# Patient Record
Sex: FEMALE | Race: BLACK OR AFRICAN AMERICAN | NOT HISPANIC OR LATINO | Employment: UNEMPLOYED | URBAN - METROPOLITAN AREA
[De-identification: names, ages, dates, MRNs, and addresses within clinical notes are randomized per-mention and may not be internally consistent; named-entity substitution may affect disease eponyms.]

---

## 2019-01-04 ENCOUNTER — HOSPITAL ENCOUNTER (EMERGENCY)
Facility: OTHER | Age: 19
Discharge: HOME | End: 2019-01-04
Attending: EMERGENCY MEDICINE
Payer: MEDICAID

## 2019-01-04 VITALS
HEIGHT: 62 IN | HEART RATE: 94 BPM | TEMPERATURE: 99 F | WEIGHT: 190 LBS | BODY MASS INDEX: 34.96 KG/M2 | RESPIRATION RATE: 18 BRPM | SYSTOLIC BLOOD PRESSURE: 124 MMHG | DIASTOLIC BLOOD PRESSURE: 72 MMHG | OXYGEN SATURATION: 99 %

## 2019-01-04 DIAGNOSIS — E86.0 DEHYDRATION: ICD-10-CM

## 2019-01-04 DIAGNOSIS — J02.0 ACUTE STREPTOCOCCAL PHARYNGITIS: Primary | ICD-10-CM

## 2019-01-04 LAB
B-HCG UR QL: NEGATIVE
CTP QC/QA: YES
DEPRECATED S PYO AG THROAT QL EIA: NEGATIVE

## 2019-01-04 PROCEDURE — 96372 THER/PROPH/DIAG INJ SC/IM: CPT

## 2019-01-04 PROCEDURE — 81025 URINE PREGNANCY TEST: CPT | Performed by: EMERGENCY MEDICINE

## 2019-01-04 PROCEDURE — 63600175 PHARM REV CODE 636 W HCPCS: Performed by: EMERGENCY MEDICINE

## 2019-01-04 PROCEDURE — 99284 EMERGENCY DEPT VISIT MOD MDM: CPT | Mod: 25

## 2019-01-04 PROCEDURE — 87880 STREP A ASSAY W/OPTIC: CPT

## 2019-01-04 PROCEDURE — 87081 CULTURE SCREEN ONLY: CPT

## 2019-01-04 PROCEDURE — 25000003 PHARM REV CODE 250: Performed by: EMERGENCY MEDICINE

## 2019-01-04 PROCEDURE — 96360 HYDRATION IV INFUSION INIT: CPT

## 2019-01-04 RX ORDER — DEXAMETHASONE SODIUM PHOSPHATE 4 MG/ML
8 INJECTION, SOLUTION INTRA-ARTICULAR; INTRALESIONAL; INTRAMUSCULAR; INTRAVENOUS; SOFT TISSUE
Status: COMPLETED | OUTPATIENT
Start: 2019-01-04 | End: 2019-01-04

## 2019-01-04 RX ORDER — ACETAMINOPHEN 650 MG/20.3ML
650 LIQUID ORAL
Status: COMPLETED | OUTPATIENT
Start: 2019-01-04 | End: 2019-01-04

## 2019-01-04 RX ADMIN — SODIUM CHLORIDE 1000 ML: 0.9 INJECTION, SOLUTION INTRAVENOUS at 12:01

## 2019-01-04 RX ADMIN — PENICILLIN G BENZATHINE 1.2 MILLION UNITS: 1200000 INJECTION, SUSPENSION INTRAMUSCULAR at 12:01

## 2019-01-04 RX ADMIN — DEXAMETHASONE SODIUM PHOSPHATE 8 MG: 4 INJECTION, SOLUTION INTRAMUSCULAR; INTRAVENOUS at 12:01

## 2019-01-04 RX ADMIN — ACETAMINOPHEN 650 MG: 160 SOLUTION ORAL at 12:01

## 2019-01-04 NOTE — ED PROVIDER NOTES
Encounter Date: 1/4/2019    SCRIBE #1 NOTE: ISherri, am scribing for, and in the presence of, Dr. Duff.       History     Chief Complaint   Patient presents with    Sore Throat     Pt c/o sore throat, and SOB for the past week. Denies cough     Time seen by provider: 11:55 AM    This is a 18 y.o. female who presents with complaint of cold symptoms for the past week. Patient reports congestion, runny nose, sore throat, cough, and shortness of breath, but denies fever. Patient denies taking OTC medications. Patient reports use of illicit drugs (marijuana), but denies consumption of alcohol or use of tobacco. Patient denies any known allergies or long term medical problems. Patient reports LMP was 10/27/2018.      The history is provided by the patient.     Review of patient's allergies indicates:  No Known Allergies  No past medical history on file.  No past surgical history on file.  No family history on file.  Social History     Tobacco Use    Smoking status: Not on file   Substance Use Topics    Alcohol use: Not on file    Drug use: Not on file     Review of Systems   Constitutional: Negative for fever.   HENT: Positive for congestion, rhinorrhea and sore throat.    Respiratory: Positive for cough and shortness of breath.    Cardiovascular: Negative for chest pain.   Gastrointestinal: Negative for diarrhea, nausea and vomiting.   Genitourinary: Negative for dysuria.   Musculoskeletal: Negative for back pain.   Skin: Negative for rash.   Neurological: Negative for weakness.   Hematological: Does not bruise/bleed easily.       Physical Exam     Initial Vitals [01/04/19 1135]   BP Pulse Resp Temp SpO2   129/76 (!) 121 18 97.2 °F (36.2 °C) 97 %      MAP       --         Physical Exam    Nursing note and vitals reviewed.  Constitutional: She appears well-developed and well-nourished. She is not diaphoretic. No distress.   HENT:   Head: Normocephalic and atraumatic.   Mouth/Throat: No trismus in the jaw.    Oropharynx erythematous. Tonsillar edema and exudates. No peritonsillar or tonsillar abscess formation. No trismus or drooling. No sheldon's angina.    Eyes: EOM are normal. Pupils are equal, round, and reactive to light.   Neck: Normal range of motion. Neck supple. No stridor present.   Cervical lymphadenopathy.    Cardiovascular: Regular rhythm and normal heart sounds. Tachycardia present.  Exam reveals no gallop and no friction rub.    No murmur heard.  Pulmonary/Chest: Breath sounds normal. No stridor. No respiratory distress. She has no wheezes. She has no rhonchi. She has no rales.   Abdominal: Soft. Bowel sounds are normal. She exhibits no distension. There is no tenderness. There is no rebound and no guarding.   Musculoskeletal: Normal range of motion.   Lymphadenopathy:     She has cervical adenopathy.   Neurological: She is alert and oriented to person, place, and time.   Skin: Skin is warm and dry.   Psychiatric: She has a normal mood and affect. Her behavior is normal. Judgment and thought content normal.         ED Course   Procedures  Labs Reviewed   THROAT SCREEN, RAPID   CULTURE, STREP A,  THROAT   POCT URINE PREGNANCY          Imaging Results    None          Medical Decision Making:   Clinical Tests:   Lab Tests: Ordered and Reviewed            Scribe Attestation:   Scribe #1: I performed the above scribed service and the documentation accurately describes the services I performed. I attest to the accuracy of the note.    Attending Attestation:           Physician Attestation for Scribe:  Physician Attestation Statement for Scribe #1: I, Dr. Duff, reviewed documentation, as scribed by Sherri Guaman in my presence, and it is both accurate and complete.         Attending ED Notes:   Emergent evaluation 18-year-old male with sore throat.  Patient is afebrile, nontoxic-appearing stable vital signs.  No trismus, stridor or drooling.  No Sheldon angina.  Patient tolerating p.o. intake without  complication.  Despite negative strep test clinically patient has strep pharyngitis.  The patient is extensively counseled on his diagnosis and treatment, discharged good condition and directed follow-up with his PCP in the next 24-48 hours.             Clinical Impression:     1. Acute streptococcal pharyngitis    2. Dehydration                                   Roque Rasheed MD  01/07/19 0518

## 2019-01-04 NOTE — ED NOTES
Pt presents with c/o sore throat, nasal congestion, subjective fevers and body aches x1 week. Pt denies any releif from OTC ibuprofen. Pt is AAOx4. RR are even and unlabored. Skin is warm and dry.

## 2019-01-06 LAB — BACTERIA THROAT CULT: NORMAL

## 2019-03-28 ENCOUNTER — HOSPITAL ENCOUNTER (EMERGENCY)
Facility: OTHER | Age: 19
Discharge: HOME | End: 2019-03-29
Attending: EMERGENCY MEDICINE
Payer: MEDICAID

## 2019-03-28 VITALS
OXYGEN SATURATION: 99 % | SYSTOLIC BLOOD PRESSURE: 124 MMHG | TEMPERATURE: 98 F | WEIGHT: 199.06 LBS | DIASTOLIC BLOOD PRESSURE: 83 MMHG | BODY MASS INDEX: 35.27 KG/M2 | RESPIRATION RATE: 20 BRPM | HEART RATE: 110 BPM | HEIGHT: 63 IN

## 2019-03-28 DIAGNOSIS — N92.1 METRORRHAGIA: Primary | ICD-10-CM

## 2019-03-28 LAB
B-HCG UR QL: NEGATIVE
CTP QC/QA: YES

## 2019-03-28 PROCEDURE — 25000003 PHARM REV CODE 250: Performed by: PHYSICIAN ASSISTANT

## 2019-03-28 PROCEDURE — 99284 EMERGENCY DEPT VISIT MOD MDM: CPT | Mod: 25

## 2019-03-28 PROCEDURE — 81025 URINE PREGNANCY TEST: CPT | Performed by: EMERGENCY MEDICINE

## 2019-03-28 RX ORDER — IBUPROFEN 600 MG/1
600 TABLET ORAL
Status: COMPLETED | OUTPATIENT
Start: 2019-03-28 | End: 2019-03-28

## 2019-03-28 RX ORDER — IBUPROFEN 600 MG/1
600 TABLET ORAL EVERY 6 HOURS PRN
Qty: 15 TABLET | Refills: 0 | Status: SHIPPED | OUTPATIENT
Start: 2019-03-28

## 2019-03-28 RX ADMIN — IBUPROFEN 600 MG: 600 TABLET ORAL at 11:03

## 2019-03-29 NOTE — ED TRIAGE NOTES
Pt reports vaginal bleeding x 1 day w/ lower abdominal pain. Pt is AAOx 3, answers questions appropriately. Using cellphone throughout intake process, does not appear in any distress

## 2019-03-29 NOTE — ED PROVIDER NOTES
"Encounter Date: 3/28/2019       History     Chief Complaint   Patient presents with    Vaginal Bleeding     reports heavy vaginal bleeding with clots x1 day pta, reports unknown if pregnant or not stating " I dont believe in taking pregnancy test at home, I always just come to the ER to get tested". LMP 11/26/2018.      Patient is a 19-year-old female with no pertinent past medical history presents to the ED with vaginal bleeding.  Patient reports passing blood clots approximately 2 hr ago.  She reports right-sided pelvic pain. Patient states her last menstrual cycle was November 26.  She states she did not take a home UPT because "those tests are expensive ".  She did not take any medicine for her pain. She reports nausea and 1 episode of emesis prior to arrival.  She denies history of irregular cycles.    The history is provided by the patient.     Review of patient's allergies indicates:  No Known Allergies  No past medical history on file.  No past surgical history on file.  No family history on file.  Social History     Tobacco Use    Smoking status: Not on file   Substance Use Topics    Alcohol use: Not on file    Drug use: Not on file     Review of Systems   Constitutional: Negative for chills and fever.   HENT: Negative for congestion and sore throat.    Eyes: Negative for pain.   Respiratory: Negative for shortness of breath.    Cardiovascular: Negative for chest pain.   Gastrointestinal: Negative for abdominal pain, diarrhea, nausea and vomiting.   Genitourinary: Positive for menstrual problem (ammenorrhea) and vaginal bleeding. Negative for dysuria.   Musculoskeletal: Negative for arthralgias.   Skin: Negative for rash.   Neurological: Negative for headaches.       Physical Exam     Initial Vitals [03/28/19 2254]   BP Pulse Resp Temp SpO2   124/83 110 20 98 °F (36.7 °C) 99 %      MAP       --         Physical Exam    Constitutional: Vital signs are normal. She is cooperative.   HENT:   Head: " Normocephalic and atraumatic.   Eyes: EOM are normal. Pupils are equal, round, and reactive to light.   Neck: Normal range of motion. Neck supple.   Cardiovascular: Normal rate, regular rhythm and intact distal pulses.   Pulmonary/Chest: Breath sounds normal. She has no wheezes. She has no rhonchi. She has no rales.   Abdominal: Soft. Bowel sounds are normal. There is tenderness (Mild right sided pelvic).   Genitourinary:   Genitourinary Comments: Attempted to perform pelvic exam, patient was noncompliant and refused.   Musculoskeletal: Normal range of motion. She exhibits no edema.   Neurological: She is alert and oriented to person, place, and time. GCS eye subscore is 4. GCS verbal subscore is 5. GCS motor subscore is 6.   Skin: Skin is warm and dry. No rash noted.         ED Course   Procedures  Labs Reviewed   URINALYSIS, REFLEX TO URINE CULTURE   POCT URINE PREGNANCY          Imaging Results    None          Medical Decision Making:   Initial Assessment:   Urgent evaluation of a 19 y.o. female presenting to the emergency department complaining of vaginal bleeding for 2 hr.  Patient concerned with passing clots.  Patient is afebrile, nontoxic appearing and hemodynamically stable.  Triage vital signs report mild tachycardia.  Patient is not tachycardic on exam.  UPT is negative. Patient is accompanited by her boyfriend.  I do not suspect ovarian torsion.  Patient is likely cramping from starting her menstrual cycle.  Pelvic exam was attempted but patient refused.  She will be given Motrin here in the ED and reasonable prescription.  She is given strict return precautions.  She is given information to follow up with [facility].                      Clinical Impression:     1. Metrorrhagia                               Joey Casillas PA-C  03/29/19 0003

## 2020-08-10 ENCOUNTER — HOSPITAL ENCOUNTER (EMERGENCY)
Facility: OTHER | Age: 20
Discharge: HOME | End: 2020-08-10
Attending: EMERGENCY MEDICINE

## 2020-08-10 VITALS
TEMPERATURE: 99 F | OXYGEN SATURATION: 99 % | DIASTOLIC BLOOD PRESSURE: 76 MMHG | BODY MASS INDEX: 38.23 KG/M2 | RESPIRATION RATE: 18 BRPM | HEART RATE: 98 BPM | WEIGHT: 215.81 LBS | SYSTOLIC BLOOD PRESSURE: 134 MMHG

## 2020-08-10 DIAGNOSIS — N76.0 ACUTE VAGINITIS: Primary | ICD-10-CM

## 2020-08-10 LAB
B-HCG UR QL: NEGATIVE
BACTERIA GENITAL QL WET PREP: ABNORMAL
CLUE CELLS VAG QL WET PREP: ABNORMAL
CTP QC/QA: YES
FILAMENT FUNGI VAG WET PREP-#/AREA: ABNORMAL
POCT GLUCOSE: 72 MG/DL (ref 70–110)
SPECIMEN SOURCE: ABNORMAL
T VAGINALIS GENITAL QL WET PREP: ABNORMAL
WBC #/AREA VAG WET PREP: ABNORMAL
YEAST GENITAL QL WET PREP: ABNORMAL

## 2020-08-10 PROCEDURE — 25000003 PHARM REV CODE 250: Performed by: EMERGENCY MEDICINE

## 2020-08-10 PROCEDURE — 99283 EMERGENCY DEPT VISIT LOW MDM: CPT | Mod: 25

## 2020-08-10 PROCEDURE — 63600175 PHARM REV CODE 636 W HCPCS: Performed by: EMERGENCY MEDICINE

## 2020-08-10 PROCEDURE — 63700000 PHARM REV CODE 250 ALT 637 W/O HCPCS: Performed by: EMERGENCY MEDICINE

## 2020-08-10 PROCEDURE — 81025 URINE PREGNANCY TEST: CPT | Performed by: EMERGENCY MEDICINE

## 2020-08-10 PROCEDURE — 82962 GLUCOSE BLOOD TEST: CPT

## 2020-08-10 PROCEDURE — 87210 SMEAR WET MOUNT SALINE/INK: CPT

## 2020-08-10 PROCEDURE — 87491 CHLMYD TRACH DNA AMP PROBE: CPT

## 2020-08-10 RX ORDER — METRONIDAZOLE 7.5 MG/G
GEL TOPICAL 2 TIMES DAILY
Qty: 45 G | Refills: 0 | OUTPATIENT
Start: 2020-08-10 | End: 2023-11-29

## 2020-08-10 RX ORDER — CEFTRIAXONE 250 MG/1
250 INJECTION, POWDER, FOR SOLUTION INTRAMUSCULAR; INTRAVENOUS
Status: DISCONTINUED | OUTPATIENT
Start: 2020-08-10 | End: 2020-08-10 | Stop reason: HOSPADM

## 2020-08-10 RX ORDER — AZITHROMYCIN 250 MG/1
1000 TABLET, FILM COATED ORAL
Status: COMPLETED | OUTPATIENT
Start: 2020-08-10 | End: 2020-08-10

## 2020-08-10 RX ORDER — ONDANSETRON 4 MG/1
8 TABLET, FILM COATED ORAL
Status: COMPLETED | OUTPATIENT
Start: 2020-08-10 | End: 2020-08-10

## 2020-08-10 RX ADMIN — AZITHROMYCIN MONOHYDRATE 1000 MG: 250 TABLET ORAL at 02:08

## 2020-08-10 RX ADMIN — ONDANSETRON HYDROCHLORIDE 8 MG: 4 TABLET, FILM COATED ORAL at 02:08

## 2020-08-10 NOTE — ED PROVIDER NOTES
Encounter Date: 8/10/2020    SCRIBE #1 NOTE: IEmi, am scribing for, and in the presence of, Dr. Lees.       History     Chief Complaint   Patient presents with    Possible Pregnancy     pt requesting pregnancy test, reports wanting wellness check     Time seen by provider: 1:45 AM    This is a 20 y.o. female who presents with complaint of white vaginal discharge that began one week ago. There is associated nausea, vomiting, abdominal pain and malodorous urine. Patient also reports urinary frequency. She states she has not had her menstrual cycle for the last several months. States her menstrual cycle is usually regular. She reports having unprotected sex, and thinks she may be pregnant. She denies fever, chill, cough, dysuria, diarrhea, or constipation. She endorses SOB with laying flat. Pt reports history of HTN but denies use of any antihypertensives. Denies history of DM.    The history is provided by the patient.     Review of patient's allergies indicates:  No Known Allergies  No past medical history on file.  No past surgical history on file.  No family history on file.  Social History     Tobacco Use    Smoking status: Never Smoker   Substance Use Topics    Alcohol use: Not Currently    Drug use: Not on file     Review of Systems   Constitutional: Negative for chills and fever.   HENT: Negative for congestion, sore throat and trouble swallowing.    Eyes: Negative for photophobia and visual disturbance.   Respiratory: Negative for cough and shortness of breath.    Cardiovascular: Negative for chest pain.   Gastrointestinal: Positive for abdominal pain, nausea and vomiting. Negative for constipation and diarrhea.   Genitourinary: Positive for frequency and vaginal discharge. Negative for dysuria and hematuria.        Positive for malodorous urine.   Musculoskeletal: Negative for back pain and neck pain.   Skin: Negative for rash and wound.   Neurological: Negative for weakness and headaches.    Psychiatric/Behavioral: Negative.        Physical Exam     Initial Vitals [08/10/20 0111]   BP Pulse Resp Temp SpO2   136/77 110 18 99 °F (37.2 °C) 98 %      MAP       --         Physical Exam    Nursing note and vitals reviewed.  Constitutional: She appears well-developed and well-nourished. No distress.   HENT:   Head: Normocephalic and atraumatic.   Mouth/Throat: Oropharynx is clear and moist.   Eyes: Conjunctivae and EOM are normal. Pupils are equal, round, and reactive to light.   Neck: Normal range of motion. Neck supple.   Cardiovascular: Normal rate, regular rhythm and normal heart sounds. Exam reveals no gallop and no friction rub.    No murmur heard.  Pulmonary/Chest: Breath sounds normal. No respiratory distress. She has no wheezes. She has no rhonchi. She has no rales.   Abdominal: Soft. There is no abdominal tenderness. There is no rebound and no guarding.   Genitourinary:    Genitourinary Comments: Mucus like discharge, malodorous. No CMT. Normal adnexal tenderness. No masses. No tenderness on bimanual exam. No rashes.     Musculoskeletal: Normal range of motion. No tenderness or edema.   Neurological: She is alert and oriented to person, place, and time. She has normal strength.   Skin: Skin is warm and dry. No rash noted.   Psychiatric: She has a normal mood and affect. Her behavior is normal. Judgment and thought content normal.         ED Course   Procedures  Labs Reviewed   VAGINAL SCREEN - Abnormal; Notable for the following components:       Result Value    WBC - Vaginal Screen Few (*)     Bacteria - Vaginal Screen Occasional (*)     All other components within normal limits   C. TRACHOMATIS/N. GONORRHOEAE BY AMP DNA   POCT URINE PREGNANCY   POCT GLUCOSE          Imaging Results    None          Medical Decision Making:   History:   Old Medical Records: I decided to obtain old medical records.  Initial Assessment:   20 year old female presents due to irregular menses with vaginal discharge  following unprotected sex as well as nausea. Plan for pelvic exam, GC and chlamydia wet prep, pregnancy test, and Zofran.    Differential Diagnosis:   Vaginal infection such as yeast infection, vaginitis, topical irritant, bacterial vaginosis, STD such as gonorrhea, chlamydia, UTI, discomfort of pregnancy, ectopic pregnancy, ovarian cysts, ovarian torsion, malignancy, constipation, colitis, diverticulitis    Clinical Tests:   Lab Tests: Ordered and Reviewed  ED Management:  Patient improved with treatment in the emergency department and comfortable going home. Discussed reasons to return and importance of followup.  Patient understands that the emergency visit today is primarily to address immediate concerns and to rule out emergent cause of symptoms and that they may require further workup and evaluation as an outpatient. All questions addressed and patient given discharge instructions and followup information.               Scribe Attestation:   Scribe #1: I performed the above scribed service and the documentation accurately describes the services I performed. I attest to the accuracy of the note.    Attending Attestation:           Physician Attestation for Scribe:  Physician Attestation Statement for Scribe #1: I, Dr. Lees, reviewed documentation, as scribed by Emi Sands in my presence, and it is both accurate and complete.                               Clinical Impression:     1. Acute vaginitis                ED Disposition Condition    Discharge Stable        ED Prescriptions     Medication Sig Dispense Start Date End Date Auth. Provider    metroNIDAZOLE (METROGEL) 0.75 % gel Apply topically 2 (two) times daily. 45 g 8/10/2020 8/10/2021 Denise Lees MD        Follow-up Information     Follow up With Specialties Details Why Contact Info    [facility]  Schedule an appointment as soon as possible for a visit   [address]  [place] [address]  [phone]      [facility]  [facility]    [address] [place] [id number]  [phone]      [facility] Obstetrics and Gynecology   [facility] [place] [address] [phone]                                     Denise Lees MD  08/10/20 4380

## 2020-08-12 LAB
C TRACH DNA SPEC QL NAA+PROBE: DETECTED
N GONORRHOEA DNA SPEC QL NAA+PROBE: NOT DETECTED

## 2023-11-28 ENCOUNTER — HOSPITAL ENCOUNTER (EMERGENCY)
Facility: OTHER | Age: 23
Discharge: HOME | End: 2023-11-29
Attending: INTERNAL MEDICINE
Payer: MEDICAID

## 2023-11-28 DIAGNOSIS — R31.9 URINARY TRACT INFECTION WITH HEMATURIA, SITE UNSPECIFIED: ICD-10-CM

## 2023-11-28 DIAGNOSIS — N39.0 URINARY TRACT INFECTION WITH HEMATURIA, SITE UNSPECIFIED: ICD-10-CM

## 2023-11-28 DIAGNOSIS — B96.89 BV (BACTERIAL VAGINOSIS): ICD-10-CM

## 2023-11-28 DIAGNOSIS — J06.9 VIRAL URI WITH COUGH: Primary | ICD-10-CM

## 2023-11-28 DIAGNOSIS — R05.9 COUGH: ICD-10-CM

## 2023-11-28 DIAGNOSIS — N76.0 BV (BACTERIAL VAGINOSIS): ICD-10-CM

## 2023-11-28 LAB
B-HCG UR QL: NEGATIVE
BACTERIA #/AREA URNS HPF: ABNORMAL /HPF
BACTERIA GENITAL QL WET PREP: ABNORMAL
BILIRUB UR QL STRIP: NEGATIVE
CLARITY UR: ABNORMAL
CLUE CELLS VAG QL WET PREP: ABNORMAL
COLOR UR: YELLOW
CTP QC/QA: YES
FILAMENT FUNGI VAG WET PREP-#/AREA: ABNORMAL
GLUCOSE UR QL STRIP: NEGATIVE
GROUP A STREP, MOLECULAR: NEGATIVE
HCV AB SERPL QL IA: NEGATIVE
HGB UR QL STRIP: NEGATIVE
HIV 1+2 AB+HIV1 P24 AG SERPL QL IA: NEGATIVE
KETONES UR QL STRIP: NEGATIVE
LEUKOCYTE ESTERASE UR QL STRIP: ABNORMAL
MICROSCOPIC COMMENT: ABNORMAL
NITRITE UR QL STRIP: NEGATIVE
PH UR STRIP: 6 [PH] (ref 5–8)
POC MOLECULAR INFLUENZA A AGN: NEGATIVE
POC MOLECULAR INFLUENZA A AGN: NEGATIVE
POC MOLECULAR INFLUENZA B AGN: NEGATIVE
POC MOLECULAR INFLUENZA B AGN: NEGATIVE
PROT UR QL STRIP: NEGATIVE
RBC #/AREA URNS HPF: 0 /HPF (ref 0–4)
SARS-COV-2 RDRP RESP QL NAA+PROBE: NEGATIVE
SARS-COV-2 RDRP RESP QL NAA+PROBE: NEGATIVE
SP GR UR STRIP: 1.01 (ref 1–1.03)
SPECIMEN SOURCE: ABNORMAL
SQUAMOUS #/AREA URNS HPF: 7 /HPF
T VAGINALIS GENITAL QL WET PREP: ABNORMAL
URN SPEC COLLECT METH UR: ABNORMAL
UROBILINOGEN UR STRIP-ACNC: 1 EU/DL
WBC #/AREA URNS HPF: >100 /HPF (ref 0–5)
WBC #/AREA VAG WET PREP: ABNORMAL
WBC CLUMPS URNS QL MICRO: ABNORMAL
YEAST GENITAL QL WET PREP: ABNORMAL

## 2023-11-28 PROCEDURE — 87086 URINE CULTURE/COLONY COUNT: CPT | Performed by: INTERNAL MEDICINE

## 2023-11-28 PROCEDURE — 87635 SARS-COV-2 COVID-19 AMP PRB: CPT | Performed by: INTERNAL MEDICINE

## 2023-11-28 PROCEDURE — 87651 STREP A DNA AMP PROBE: CPT | Performed by: PHYSICIAN ASSISTANT

## 2023-11-28 PROCEDURE — 87210 SMEAR WET MOUNT SALINE/INK: CPT | Performed by: INTERNAL MEDICINE

## 2023-11-28 PROCEDURE — 86803 HEPATITIS C AB TEST: CPT | Performed by: EMERGENCY MEDICINE

## 2023-11-28 PROCEDURE — 81000 URINALYSIS NONAUTO W/SCOPE: CPT | Performed by: INTERNAL MEDICINE

## 2023-11-28 PROCEDURE — 81025 URINE PREGNANCY TEST: CPT | Performed by: PHYSICIAN ASSISTANT

## 2023-11-28 PROCEDURE — 87077 CULTURE AEROBIC IDENTIFY: CPT | Performed by: INTERNAL MEDICINE

## 2023-11-28 PROCEDURE — 25000003 PHARM REV CODE 250: Performed by: INTERNAL MEDICINE

## 2023-11-28 PROCEDURE — 87389 HIV-1 AG W/HIV-1&-2 AB AG IA: CPT | Performed by: EMERGENCY MEDICINE

## 2023-11-28 PROCEDURE — 87186 SC STD MICRODIL/AGAR DIL: CPT | Performed by: INTERNAL MEDICINE

## 2023-11-28 PROCEDURE — 87088 URINE BACTERIA CULTURE: CPT | Performed by: INTERNAL MEDICINE

## 2023-11-28 PROCEDURE — 87491 CHLMYD TRACH DNA AMP PROBE: CPT | Performed by: INTERNAL MEDICINE

## 2023-11-28 PROCEDURE — 99284 EMERGENCY DEPT VISIT MOD MDM: CPT | Mod: 25

## 2023-11-28 RX ORDER — IBUPROFEN 600 MG/1
600 TABLET ORAL
Status: COMPLETED | OUTPATIENT
Start: 2023-11-28 | End: 2023-11-28

## 2023-11-28 RX ORDER — IBUPROFEN 600 MG/1
TABLET ORAL
Status: DISCONTINUED
Start: 2023-11-28 | End: 2023-11-29 | Stop reason: HOSPADM

## 2023-11-28 RX ADMIN — IBUPROFEN 600 MG: 600 TABLET, FILM COATED ORAL at 10:11

## 2023-11-29 VITALS
BODY MASS INDEX: 32.44 KG/M2 | RESPIRATION RATE: 18 BRPM | DIASTOLIC BLOOD PRESSURE: 75 MMHG | OXYGEN SATURATION: 98 % | WEIGHT: 190 LBS | HEIGHT: 64 IN | SYSTOLIC BLOOD PRESSURE: 128 MMHG | HEART RATE: 75 BPM | TEMPERATURE: 98 F

## 2023-11-29 PROCEDURE — 25000003 PHARM REV CODE 250: Performed by: INTERNAL MEDICINE

## 2023-11-29 RX ORDER — CEPHALEXIN 500 MG/1
500 CAPSULE ORAL EVERY 12 HOURS
Qty: 10 CAPSULE | Refills: 0 | Status: SHIPPED | OUTPATIENT
Start: 2023-11-29 | End: 2023-12-04

## 2023-11-29 RX ORDER — METRONIDAZOLE 500 MG/1
500 TABLET ORAL 2 TIMES DAILY
Qty: 14 TABLET | Refills: 0 | Status: SHIPPED | OUTPATIENT
Start: 2023-11-29 | End: 2023-12-06

## 2023-11-29 RX ORDER — CEPHALEXIN 500 MG/1
500 CAPSULE ORAL
Status: COMPLETED | OUTPATIENT
Start: 2023-11-29 | End: 2023-11-29

## 2023-11-29 RX ORDER — METRONIDAZOLE 500 MG/1
500 TABLET ORAL
Status: COMPLETED | OUTPATIENT
Start: 2023-11-29 | End: 2023-11-29

## 2023-11-29 RX ADMIN — CEPHALEXIN 500 MG: 500 CAPSULE ORAL at 12:11

## 2023-11-29 RX ADMIN — METRONIDAZOLE 500 MG: 500 TABLET ORAL at 12:11

## 2023-11-29 NOTE — FIRST PROVIDER EVALUATION
Emergency Department TeleTriage Encounter Note      CHIEF COMPLAINT    Chief Complaint   Patient presents with    Cough     X 2 days.. hurts ribs when coughing     Sore Throat     X 2 days       VITAL SIGNS   Initial Vitals [11/28/23 2054]   BP Pulse Resp Temp SpO2   139/87 76 16 98.5 °F (36.9 °C) 100 %      MAP       --            ALLERGIES    Review of patient's allergies indicates:  No Known Allergies    PROVIDER TRIAGE NOTE  This is a teletriage evaluation of a 23 y.o. female presenting to the ED complaining of sore throat for two days.     Initial orders will be placed and care will be transferred to an alternate provider when patient is roomed for a full evaluation. Any additional orders and the final disposition will be determined by that provider.       ORDERS  Labs Reviewed   GROUP A STREP, MOLECULAR   HIV 1 / 2 ANTIBODY   HEPATITIS C ANTIBODY   POCT URINE PREGNANCY       ED Orders (720h ago, onward)      Start Ordered     Status Ordering Provider    11/28/23 2104 11/28/23 2103  POCT urine pregnancy  Once         Ordered RK GROVES    11/28/23 2103 11/28/23 2102  Group A Strep, Molecular  STAT         Ordered RK GROVES    11/28/23 2057 11/28/23 2056  HIV 1/2 Ag/Ab (4th Gen)  STAT         Ordered MARIANN DELGADO    11/28/23 2057 11/28/23 2056  Hepatitis C Antibody  STAT         Ordered MARIANN DELGADO              Virtual Visit Note: The provider triage portion of this emergency department evaluation and documentation was performed via [employer], a HIPAA-compliant telemedicine application, in concert with a tele-presenter in the room. A face to face patient evaluation with one of my colleagues will occur once the patient is placed in an emergency department room.      DISCLAIMER: This note was prepared with [employer] voice recognition transcription software. Garbled syntax, mangled pronouns, and other bizarre constructions may be attributed to that software system.

## 2023-11-29 NOTE — DISCHARGE INSTRUCTIONS
Diagnosis:   1. Viral URI with cough    2. Cough        Tests you had showed:   Labs Reviewed   GROUP A STREP, MOLECULAR   HIV 1 / 2 ANTIBODY   HEPATITIS C ANTIBODY   POCT URINE PREGNANCY   SARS-COV-2 RDRP GENE   POCT INFLUENZA A/B MOLECULAR      X-Ray Chest PA And Lateral    (Results Pending)       Treatments you received were:   Medications   ibuprofen tablet 600 mg (has no administration in time range)       Home Care Instructions:  - Medications: Continue taking your home medications as prescribed    Follow-Up Plan:  - Follow-up with: Primary care doctor or provider of your choice  within 1-2  days  - Additional testing and/or evaluation will be directed by your primary doctor    Return to the Emergency Department for symptoms including but not limited to: worsening symptoms, severe back pain, shortness of breath or chest pain, vomiting with inability to hold down fluids, blood in vomit or poop, fevers greater than 100.4°F, passing out/fainting/unconsciousness, or other concerning symptoms.     Future Appointments   Date Time Provider Department Center   11/28/2023  9:45 PM [facility] XRAY ED [facility] X[facility]     You were diagnosed with a sore throat. You can use warm salt-water gargle for supportive care. You can also try over the coutner Chloraseptic that you can find in a grocery store or pharmacy. Please follow the instructions or ask a pharmacist for clarifying instructions if you have questions based on the dosing. We recommend that you drink plenty of fluids and try to stay as hydrated as possible. Warm teas, soup and popsicles can help soothe the throat. Please come back to the ED if you began to have feelings of throat closure, difficulty breathing, changes in the patient's voice, difficulty tolerating spit.       You can use motrin and tylenol for symptoms. You can also try Flonase over the counter for congestion, cough drops, and Mucinex as well. Please consult with a pharmacist if there you have  any questions.

## 2023-11-29 NOTE — ED PROVIDER NOTES
Encounter date: 9:19 PM 11/29/2023  SCRIBE #1 NOTE: I, Wali Alfaro, am scribing for, and in the presence of,  Vahe Boyer MD. I have scribed the following portions of the note - Other sections scribed: HPI, ROS, PE.        Source of History   Patient     Chief Complaint   Pt presents with:   Cough (X 2 days.. hurts ribs when coughing ) and Sore Throat (X 2 days)      History Of Present Illness   Lorenza Grajeda is a 23 y.o. female with no PMHx on file who presents to the ED with complaint of cough and sore throat for the last 2 days. She reports associated left sided rib pain that she feels with coughing bouts which began yesterday, nasal congestion, a cough with yellow sputum. She does note shortness of breath but states that she only has this in the morning when she was congested.  After her congestion clear she notes that her breathing is normal and she does not have shortness of breath or difficulty breathing when she is at rest or moving around throughout the rest of the day. She states that she is had throat pain with swallowing but has been tolerating food and liquid, and able to tolerate her own secretions. She states that for this reason, she has not yet taken any medication for her symptoms. She denies any fever or chills. She additionally denies taking any daily medications, birth control, or hormones, and denies having any medical allergies, history of cancer or blood clots in her heart or lungs, recent surgeries, or immobility in the last 4 days.  She denies chest pain, lower leg swelling, family history of blood clots, previous blood clots.  Denies dysuria, hematuria.    Denies: fever, chills  This is the extent to the patients complaints today here in the emergency department.  Past History   Review of patient's allergies indicates:  No Known Allergies    No current facility-administered medications on file prior to encounter.     Current Outpatient Medications on File Prior to Encounter  "  Medication Sig Dispense Refill    ibuprofen (ADVIL,MOTRIN) 600 MG tablet Take 1 tablet (600 mg total) by mouth every 6 (six) hours as needed for Pain. 15 tablet 0    [DISCONTINUED] metroNIDAZOLE (METROGEL) 0.75 % gel Apply topically 2 (two) times daily. 45 g 0       As per HPI and below:  No past medical history on file.  No past surgical history on file.    Social History     Socioeconomic History    Marital status: Single   Tobacco Use    Smoking status: Never   Substance and Sexual Activity    Alcohol use: Not Currently       No family history on file.    Physical Exam     Vitals:    11/28/23 2054 11/28/23 2218 11/29/23 0016   BP: 139/87  128/75   BP Location: Left arm  Left arm   Patient Position: Sitting  Sitting   Pulse: 76  75   Resp: 16 19 18   Temp: 98.5 °F (36.9 °C)  98.1 °F (36.7 °C)   TempSrc: Oral  Oral   SpO2: 100% 98%    Weight: 86.2 kg (190 lb)     Height: 5' 4" (1.626 m)       Physical Exam:   Nursing note and vitals reviewed.  Appearance: well-appearing, nontoxic female in no acute respiratory distress.  Making purposeful movements.  Speaking in full sentences.  Skin: No obvious rashes seen.  Good turgor.  No abrasions.  No ecchymoses.  Eyes: No conjunctival injection. EOMI, PERRL.  Head: NC/AT  ENT: Mild erythema and 1+ tonsillar hypertrophy. No exudate. Tolerating secretions. No trismus.  Chest: CTAB. No increased work of breathing, bilateral chest rise.  Cardiovascular: Regular rate and rhythm.  Normal equal bilateral radial pulses.  Abdomen: Soft.  Not distended.  Nontender.  No guarding.  No rebound. No Masses  Musculoskeletal: No obvious deformities.   Neck supple, full range of motion, no obvious deformity.   No tenderness to palpation of cervical through lumbar spine.  No step-offs or deformities. Good range of motion all joints.  Neurologic: Moves all extremities.  Normal sensation.  No facial droop.  Normal speech.    Mental Status:  Alert and oriented x 3.  Appropriate, " conversant.      Results and Medications    Procedures    Labs Reviewed   VAGINAL SCREEN - Abnormal; Notable for the following components:       Result Value    Clue Cells Few (*)     WBC - Vaginal Screen Occasional (*)     Bacteria - Vaginal Screen Few (*)     All other components within normal limits    Narrative:     Release to patient->Immediate   URINALYSIS, REFLEX TO URINE CULTURE - Abnormal; Notable for the following components:    Appearance, UA Hazy (*)     Leukocytes, UA 2+ (*)     All other components within normal limits    Narrative:     Specimen Source->Urine   URINALYSIS MICROSCOPIC - Abnormal; Notable for the following components:    WBC, UA >100 (*)     WBC Clumps, UA Few (*)     Bacteria Many (*)     All other components within normal limits    Narrative:     Specimen Source->Urine   GROUP A STREP, MOLECULAR   C. TRACHOMATIS/N. GONORRHOEAE BY AMP DNA   CULTURE, URINE   HIV 1 / 2 ANTIBODY    Narrative:     Release to patient->Immediate   HEPATITIS C ANTIBODY    Narrative:     Release to patient->Immediate   POCT URINE PREGNANCY   SARS-COV-2 RDRP GENE   POCT INFLUENZA A/B MOLECULAR       Imaging Results              X-Ray Chest PA And Lateral (Final result)  Result time 11/28/23 21:53:53      Final result by Lena Sarah MD (11/28/23 21:53:53)                   Impression:      No acute cardiopulmonary process identified.      Electronically signed by: Lena Sarah MD  Date:    11/28/2023  Time:    21:53               Narrative:    EXAMINATION:  XR CHEST PA AND LATERAL    CLINICAL HISTORY:  Cough, unspecified    TECHNIQUE:  PA and lateral views of the chest were performed.    COMPARISON:  None    FINDINGS:  Cardiac silhouette is normal in size.  Lungs are symmetrically expanded.  No evidence of focal consolidative process, pneumothorax, or significant pleural effusion.  No acute osseous abnormality identified.                                          Medications   ibuprofen (ADVIL,MOTRIN) 600  MG tablet (has no administration in time range)   ibuprofen tablet 600 mg (600 mg Oral Given 11/28/23 2207)   metroNIDAZOLE tablet 500 mg (500 mg Oral Given 11/29/23 0011)   cephALEXin capsule 500 mg (500 mg Oral Given 11/29/23 0011)       MDM, Impression and Plan   Independently Interpreted Test(s):   IMAGING:  I have ordered and independently interpreted X-rays and my findings are as follow:  Detailed here or in ED course. CXR shows no pneumothorax, pneumonia or pleural effusions.    Clinical Tests:   Lab Tests: Ordered and Reviewed  Radiological Study: Ordered and Reviewed  Medical Tests: Ordered and Reviewed    Differential diagnosis:  Covid, flu, viral syndrome.  Unlikely RPA, PTA based off physical exam.  Unlikely severe invasive bacterial illness based on exam and presentation.  Thought was given to pulmonary embolism, but PERC negative with low pretest probability thuss, No further investigation with imaging deemed necessary to rule out PE.    Initial Assessment & ED Management:    Urgent evaluation of 23 y.o. female with No significant past medical history who presents the ED with chief complaint sore throat and cough with associated left-sided rib pain with cough x2 days.  On arrival to the ED she is noted to be afebrile, hemodynamically stable in no acute respiratory distress.  Chest x-ray with no pneumonia pneumothorax or pleural effusion.  COVID and flu negative.  Urine pregnancy negative.  Group a strep negative.  Patient p.o. challenged successfully.  She did not desaturate on ambulatory pulse ox monitoring.  PERC negative, making pulmonary embolus unlikely.  Thought was given to ACS yet this is unlikely based off her history complaint and nature chest wall pain with cough, no chest wall pain or chest pain or shortness of breath while in ED. noted symptom improvement with Motrin.  At time of discharge patient informed the nurse that she was having dysuria and concerns for UTI, though she previously  denied these symptoms or complaints during initial history and physical exam.  She requested STI testing.  She denies vaginal bleeding, discomfort or genital lesions.  Wet prep notable for BV.  Urinalysis with many bacteria and greater than 100 white blood cells and positive leuks with no nitrites.  This could be due to her BV but this also could be a UTI.  Had shared decision-making with patient.  She opted to have treatment for BV and UTI while her urine culture is pending.  Started on Keflex and metronidazole.  Warned of disulfiram reaction.  She notes that her G/C results are pending.  Supportive care addressed for her upper respiratory viral complaints.  Instructed to follow up with PCP in the next 1-2 days.  I also message social workers as she states that she does not have a PCP yet she was discharged with information for outpatient follow-up in the community.  Care plan addressed with patient and all those present. All questions answered.  Strict return precautions discussed.  Patient was instructed on the correct follow-up time and route.  They voiced verbal understanding and agreement  with the plan and were deemed stable for discharge.       Medical Decision Making  Amount and/or Complexity of Data Reviewed  Labs: ordered. Decision-making details documented in ED Course.  Radiology: ordered.    Risk  Prescription drug management.        Additional MDM:   PERC Rule:   Age is greater than or equal to 50 = 0.0  Heart Rate is greater than or equal to 100 = 0.0  SaO2 on room air < 95% = 0.0  Unilateral leg swelling = 0.0  Hemoptysis = 0.0  Recent surgery or trauma = 0.0  Prior PE or DVT =  0.0  Hormone use = 0.00  PERC Score = 0      Please see ED course for discussion of workup and dispo if not listed above.          Final diagnoses:  [R05.9] Cough  [J06.9] Viral URI with cough (Primary)  [N76.0, B96.89] BV (bacterial vaginosis)  [N39.0, R31.9] Urinary tract infection with hematuria, site unspecified         ED Disposition Condition    Discharge Stable          ED Prescriptions       Medication Sig Dispense Start Date End Date Auth. Provider    metroNIDAZOLE (FLAGYL) 500 MG tablet Take 1 tablet (500 mg total) by mouth 2 (two) times a day. for 7 days 14 tablet 11/29/2023 12/6/2023 Vahe Boyer MD    cephALEXin (KEFLEX) 500 MG capsule Take 1 capsule (500 mg total) by mouth every 12 (twelve) hours. for 5 days 10 capsule 11/29/2023 12/4/2023 Vahe Boyer MD          Follow-up Information       Follow up With Specialties Details Why Contact Info    [facility]  Schedule an appointment as soon as possible for a visit in 2 days or the primary care doctor of your choice [facility] [id number]  [phone]      [facility]  Schedule an appointment as soon as possible for a visit in 2 days  [address] [place] [id number]            ED Course as of 11/29/23 0059   Tue Nov 28, 2023 2127 Group A Strep, Molecular: Negative [HM]   2309 Clue Cells, Wet Prep(!): Few [HM]   2357 Leukocytes, UA(!): 2+ [HM]      ED Course User Index  [HM] Vahe Boyer MD         Scribe Attestation:   Scribe #1: I performed the above scribed service and the documentation accurately describes the services I performed. I attest to the accuracy of the note.    Physician Attestation for Scribe: I, Vahe Boyer MD , reviewed documentation as scribed in my presence, which is both accurate and complete.         MD Merlin Chowdhury Heyward B, MD  11/29/23 0059

## 2023-11-29 NOTE — ED TRIAGE NOTES
Productive cough with sore throat x 2 days. Now with c/o chest wall pain with cough. Denies fever. Denies using OTC meds for symptoms. States daughter recently with same symptoms. Presents in no distress.

## 2023-11-30 LAB
C TRACH DNA SPEC QL NAA+PROBE: DETECTED
N GONORRHOEA DNA SPEC QL NAA+PROBE: NOT DETECTED

## 2023-12-01 LAB — BACTERIA UR CULT: ABNORMAL
